# Patient Record
Sex: MALE | Race: WHITE | NOT HISPANIC OR LATINO | Employment: OTHER | URBAN - METROPOLITAN AREA
[De-identification: names, ages, dates, MRNs, and addresses within clinical notes are randomized per-mention and may not be internally consistent; named-entity substitution may affect disease eponyms.]

---

## 2017-02-06 ENCOUNTER — HOSPITAL ENCOUNTER (OUTPATIENT)
Dept: OTHER | Facility: HOSPITAL | Age: 67
Discharge: HOME | End: 2017-02-06
Attending: INTERNAL MEDICINE | Admitting: INTERNAL MEDICINE

## 2018-02-05 ENCOUNTER — HOSPITAL ENCOUNTER (OUTPATIENT)
Dept: OTHER | Facility: HOSPITAL | Age: 68
Discharge: HOME | End: 2018-02-05
Attending: INTERNAL MEDICINE | Admitting: INTERNAL MEDICINE

## 2019-07-12 PROBLEM — I10 HYPERTENSION: Status: ACTIVE | Noted: 2019-07-12

## 2019-07-12 PROBLEM — I25.119: Status: ACTIVE | Noted: 2019-07-12

## 2019-07-12 PROBLEM — M15.9 OSTEOARTHRITIS OF MULTIPLE JOINTS: Status: ACTIVE | Noted: 2019-07-12

## 2019-07-12 PROBLEM — F41.9 ANXIETY: Status: ACTIVE | Noted: 2019-07-12

## 2019-07-12 PROBLEM — E11.9 CONTROLLED TYPE 2 DIABETES MELLITUS WITHOUT COMPLICATION, WITHOUT LONG-TERM CURRENT USE OF INSULIN: Status: ACTIVE | Noted: 2019-07-12

## 2019-07-12 PROBLEM — I73.9 PERIPHERAL VASCULAR DISEASE (HCC): Status: ACTIVE | Noted: 2019-07-12

## 2019-07-12 PROBLEM — F32.A DEPRESSIVE DISORDER: Status: ACTIVE | Noted: 2019-07-12

## 2019-07-12 PROBLEM — K21.9 GASTROESOPHAGEAL REFLUX DISEASE: Status: ACTIVE | Noted: 2019-07-12

## 2019-07-12 PROBLEM — H91.90 HEARING LOSS: Status: ACTIVE | Noted: 2019-07-12

## 2019-07-12 PROBLEM — J45.909 ASTHMA: Status: ACTIVE | Noted: 2019-07-12

## 2019-07-12 PROBLEM — E78.2 MIXED HYPERLIPIDEMIA: Status: ACTIVE | Noted: 2019-07-12

## 2019-07-12 RX ORDER — SUCRALFATE 1 G/1
1 TABLET ORAL 4 TIMES DAILY
COMMUNITY

## 2019-07-12 RX ORDER — LISINOPRIL 2.5 MG/1
2.5 TABLET ORAL DAILY
COMMUNITY
End: 2020-02-06 | Stop reason: ALTCHOICE

## 2019-07-12 RX ORDER — GLIPIZIDE 10 MG/1
10 TABLET ORAL
COMMUNITY
End: 2022-01-20

## 2019-07-12 RX ORDER — NITROGLYCERIN 0.4 MG/1
0.4 TABLET SUBLINGUAL
COMMUNITY

## 2019-07-12 RX ORDER — TADALAFIL 10 MG/1
10 TABLET ORAL DAILY PRN
COMMUNITY

## 2019-07-12 RX ORDER — CLOPIDOGREL BISULFATE 75 MG/1
75 TABLET ORAL DAILY
COMMUNITY
End: 2019-07-12

## 2019-07-12 RX ORDER — PANTOPRAZOLE SODIUM 40 MG/1
40 TABLET, DELAYED RELEASE ORAL DAILY
COMMUNITY

## 2019-07-12 RX ORDER — ASPIRIN 325 MG
325 TABLET ORAL DAILY
COMMUNITY

## 2019-07-12 RX ORDER — MONTELUKAST SODIUM 10 MG/1
10 TABLET ORAL NIGHTLY
COMMUNITY
End: 2020-07-06

## 2019-07-12 RX ORDER — CLOPIDOGREL BISULFATE 75 MG/1
75 TABLET ORAL DAILY
COMMUNITY

## 2019-07-12 RX ORDER — LOVASTATIN 20 MG/1
20 TABLET ORAL NIGHTLY
COMMUNITY

## 2019-07-12 RX ORDER — AMLODIPINE BESYLATE 2.5 MG/1
2.5 TABLET ORAL DAILY
COMMUNITY
End: 2022-01-20

## 2019-07-15 ENCOUNTER — OFFICE VISIT (OUTPATIENT)
Dept: FAMILY MEDICINE CLINIC | Facility: CLINIC | Age: 69
End: 2019-07-15

## 2019-07-15 VITALS
HEART RATE: 78 BPM | HEIGHT: 67 IN | RESPIRATION RATE: 18 BRPM | WEIGHT: 177 LBS | SYSTOLIC BLOOD PRESSURE: 128 MMHG | OXYGEN SATURATION: 98 % | BODY MASS INDEX: 27.78 KG/M2 | DIASTOLIC BLOOD PRESSURE: 88 MMHG | TEMPERATURE: 97.9 F

## 2019-07-15 DIAGNOSIS — I10 HYPERTENSION, UNSPECIFIED TYPE: ICD-10-CM

## 2019-07-15 DIAGNOSIS — I73.9 PERIPHERAL VASCULAR DISEASE (HCC): ICD-10-CM

## 2019-07-15 DIAGNOSIS — J45.909 ASTHMA, UNSPECIFIED ASTHMA SEVERITY, UNSPECIFIED WHETHER COMPLICATED, UNSPECIFIED WHETHER PERSISTENT: ICD-10-CM

## 2019-07-15 DIAGNOSIS — E78.2 MIXED HYPERLIPIDEMIA: ICD-10-CM

## 2019-07-15 DIAGNOSIS — E11.9 CONTROLLED TYPE 2 DIABETES MELLITUS WITHOUT COMPLICATION, WITHOUT LONG-TERM CURRENT USE OF INSULIN (HCC): ICD-10-CM

## 2019-07-15 DIAGNOSIS — I25.119 ATHEROSCLEROSIS OF CORONARY ARTERY WITH ANGINA PECTORIS, UNSPECIFIED VESSEL OR LESION TYPE, UNSPECIFIED WHETHER NATIVE OR TRANSPLANTED HEART (HCC): ICD-10-CM

## 2019-07-15 DIAGNOSIS — F41.9 ANXIETY: ICD-10-CM

## 2019-07-15 DIAGNOSIS — M15.9 OSTEOARTHRITIS OF MULTIPLE JOINTS, UNSPECIFIED OSTEOARTHRITIS TYPE: ICD-10-CM

## 2019-07-15 DIAGNOSIS — K21.9 GASTROESOPHAGEAL REFLUX DISEASE, ESOPHAGITIS PRESENCE NOT SPECIFIED: ICD-10-CM

## 2019-07-15 DIAGNOSIS — H91.90 HEARING LOSS, UNSPECIFIED HEARING LOSS TYPE, UNSPECIFIED LATERALITY: Primary | ICD-10-CM

## 2019-07-15 DIAGNOSIS — G62.9 NEUROPATHY: ICD-10-CM

## 2019-07-15 DIAGNOSIS — F32.A DEPRESSIVE DISORDER: ICD-10-CM

## 2019-07-15 PROCEDURE — 99214 OFFICE O/P EST MOD 30 MIN: CPT | Performed by: FAMILY MEDICINE

## 2019-07-15 NOTE — PROGRESS NOTES
Subjective   Angelo Child is a 69 y.o. male.      reports shooting pain down legs, numb/tingling and pain in feet have recently got worse. A1C over 9 at [facility].  Pt states agent orange contributes to neuropathy      Lower Extremity Issue   The current episode started more than 1 year ago (3). Associated symptoms include fatigue and numbness. Pertinent negatives include no abdominal pain, chest pain, fever, joint swelling, nausea, neck pain, rash, swollen glands, vomiting or weakness.   Diabetes   He presents for his follow-up ([facility]) diabetic visit. He has type 2 diabetes mellitus. His disease course has been worsening. Associated symptoms include fatigue. Pertinent negatives for diabetes include no chest pain, no polydipsia, no polyuria and no weakness. Diabetic complications include peripheral neuropathy. Risk factors for coronary artery disease include diabetes mellitus, dyslipidemia, male sex and hypertension.        The following portions of the patient's history were reviewed and updated as appropriate: allergies, current medications, past family history, past medical history, past social history, past surgical history and problem list.    Patient Active Problem List   Diagnosis   • Hearing loss   • Anxiety   • Peripheral vascular disease (CMS/[facility])   • Osteoarthritis of multiple joints   • Asthma   • Controlled type 2 diabetes mellitus without complication, without long-term current use of insulin (CMS/[facility])   • Hypertension   • Depressive disorder   • Atherosclerosis of coronary artery with angina pectoris (CMS/[facility])   • Mixed hyperlipidemia   • Gastroesophageal reflux disease   • Neuropathy       Current Outpatient Medications on File Prior to Visit   Medication Sig Dispense Refill   • amLODIPine (NORVASC) 2.5 MG tablet Take 2.5 mg by mouth Daily.     • aspirin 325 MG tablet Take 325 mg by mouth Daily.     • clopidogrel (PLAVIX) 75 MG tablet Take 75 mg by mouth Daily.     • glipiZIDE  (GLUCOTROL) 10 MG tablet Take 10 mg by mouth 2 (Two) Times a Day Before Meals.     • lisinopril (PRINIVIL,ZESTRIL) 2.5 MG tablet Take 2.5 mg by mouth Daily.     • lovastatin (MEVACOR) 20 MG tablet Take 20 mg by mouth Every Night.     • metFORMIN (GLUCOPHAGE) 500 MG tablet Take 500 mg by mouth 2 (Two) Times a Day With Meals.     • montelukast (SINGULAIR) 10 MG tablet Take 10 mg by mouth Every Night.     • pantoprazole (PROTONIX) 40 MG EC tablet Take 40 mg by mouth Daily.     • sucralfate (CARAFATE) 1 g tablet Take 1 g by mouth 4 (Four) Times a Day.     • tadalafil (CIALIS) 10 MG tablet Take 10 mg by mouth Daily As Needed.     • nitroglycerin (NITROSTAT) 0.4 MG SL tablet Place 0.4 mg under the tongue Every 5 (Five) Minutes As Needed. Take no more than 3 doses in 15 minutes.       No current facility-administered medications on file prior to visit.        Allergies   Allergen Reactions   • Flexeril [Cyclobenzaprine] Rash   • Lexapro [Escitalopram Oxalate] Rash       Review of Systems   Constitutional: Positive for fatigue. Negative for activity change, appetite change and fever.   HENT: Negative for ear pain and voice change.    Eyes: Negative for visual disturbance.   Respiratory: Negative for shortness of breath and wheezing.    Cardiovascular: Negative for chest pain and leg swelling.   Gastrointestinal: Negative for abdominal pain, blood in stool, constipation, diarrhea, nausea and vomiting.   Endocrine: Negative for polydipsia and polyuria.   Genitourinary: Negative for dysuria, frequency and hematuria.   Musculoskeletal: Negative for joint swelling, neck pain and neck stiffness.   Skin: Negative for rash and bruise.   Neurological: Positive for numbness. Negative for weakness and headache.   Psychiatric/Behavioral: Negative for suicidal ideas and depressed mood.       Objective   Physical Exam   Constitutional: He is oriented to person, place, and time. He appears well-developed and well-nourished.   Eyes:  Conjunctivae and EOM are normal. Pupils are equal, round, and reactive to light.   Neck: Normal range of motion. Neck supple.   Cardiovascular: Normal rate, regular rhythm and normal heart sounds.   Pulmonary/Chest: Effort normal and breath sounds normal.   Abdominal: Soft. Bowel sounds are normal.   Musculoskeletal: Normal range of motion.   Neurological: He is alert and oriented to person, place, and time. A sensory deficit is present.   Skin: Skin is warm and dry.   Psychiatric: He has a normal mood and affect. His behavior is normal. Judgment and thought content normal.         Assessment/Plan .  Problem List Items Addressed This Visit     Hearing loss - Primary    Anxiety    Peripheral vascular disease (CMS/[facility])    Relevant Medications    clopidogrel (PLAVIX) 75 MG tablet    nitroglycerin (NITROSTAT) 0.4 MG SL tablet    Osteoarthritis of multiple joints    Asthma    Relevant Medications    montelukast (SINGULAIR) 10 MG tablet    Controlled type 2 diabetes mellitus without complication, without long-term current use of insulin (CMS/[facility])    Current Assessment & Plan     Diabetes is worsening.   Continue current treatment regimen.  Diabetes will be reassessed in 3 months     [facility] Consider trulicity. [facility] with [facility].         Relevant Medications    glipiZIDE (GLUCOTROL) 10 MG tablet    metFORMIN (GLUCOPHAGE) 500 MG tablet    Other Relevant Orders    TSH    Hypertension    Relevant Medications    lisinopril (PRINIVIL,ZESTRIL) 2.5 MG tablet    amLODIPine (NORVASC) 2.5 MG tablet    Other Relevant Orders    CBC Auto Differential    Depressive disorder    Atherosclerosis of coronary artery with angina pectoris (CMS/[facility])    Relevant Medications    tadalafil (CIALIS) 10 MG tablet    clopidogrel (PLAVIX) 75 MG tablet    aspirin 325 MG tablet    amLODIPine (NORVASC) 2.5 MG tablet    nitroglycerin (NITROSTAT) 0.4 MG SL tablet    Mixed hyperlipidemia    Relevant Medications    lovastatin (MEVACOR) 20  MG tablet    Other Relevant Orders    Comprehensive Metabolic Panel    Lipid Panel With / Chol / HDL Ratio    TSH    Gastroesophageal reflux disease    Relevant Medications    sucralfate (CARAFATE) 1 g tablet    pantoprazole (PROTONIX) 40 MG EC tablet    Neuropathy    Current Assessment & Plan     Likely multifactorial         Relevant Orders    TSH    Sedimentation Rate    Vitamin B12    Folate    Vitamin B1, Whole Blood        Findings discussed. All questions answered.  Follow-up after testing complete, sooner for worsening symptoms or any concerns

## 2019-07-15 NOTE — ASSESSMENT & PLAN NOTE
Diabetes is worsening.   Continue current treatment regimen.  Diabetes will be reassessed in 3 months     [facility] trulicity. [facility].

## 2019-07-17 LAB
ALBUMIN SERPL-MCNC: 4.5 G/DL (ref 3.6–4.8)
ALBUMIN/GLOB SERPL: 2 {RATIO} (ref 1.2–2.2)
ALP SERPL-CCNC: 64 IU/L (ref 39–117)
ALT SERPL-CCNC: 27 IU/L (ref 0–44)
AST SERPL-CCNC: 24 IU/L (ref 0–40)
BASOPHILS # BLD AUTO: 0 X10E3/UL (ref 0–0.2)
BASOPHILS NFR BLD AUTO: 1 %
BILIRUB SERPL-MCNC: 0.3 MG/DL (ref 0–1.2)
BUN SERPL-MCNC: 14 MG/DL (ref 8–27)
BUN/CREAT SERPL: 16 (ref 10–24)
CALCIUM SERPL-MCNC: 9.5 MG/DL (ref 8.6–10.2)
CHLORIDE SERPL-SCNC: 96 MMOL/L (ref 96–106)
CHOLEST SERPL-MCNC: 188 MG/DL (ref 100–199)
CHOLEST/HDLC SERPL: 4.6 RATIO (ref 0–5)
CO2 SERPL-SCNC: 22 MMOL/L (ref 20–29)
CREAT SERPL-MCNC: 0.89 MG/DL (ref 0.76–1.27)
EOSINOPHIL # BLD AUTO: 0.2 X10E3/UL (ref 0–0.4)
EOSINOPHIL NFR BLD AUTO: 4 %
ERYTHROCYTE [DISTWIDTH] IN BLOOD BY AUTOMATED COUNT: 12.9 % (ref 12.3–15.4)
ERYTHROCYTE [SEDIMENTATION RATE] IN BLOOD BY WESTERGREN METHOD: 2 MM/HR (ref 0–30)
FOLATE SERPL-MCNC: 13.5 NG/ML
GLOBULIN SER CALC-MCNC: 2.3 G/DL (ref 1.5–4.5)
GLUCOSE SERPL-MCNC: 222 MG/DL (ref 65–99)
HCT VFR BLD AUTO: 42.8 % (ref 37.5–51)
HDLC SERPL-MCNC: 41 MG/DL
HGB BLD-MCNC: 14.3 G/DL (ref 13–17.7)
IMM GRANULOCYTES # BLD AUTO: 0 X10E3/UL (ref 0–0.1)
IMM GRANULOCYTES NFR BLD AUTO: 0 %
LDLC SERPL CALC-MCNC: 104 MG/DL (ref 0–99)
LYMPHOCYTES # BLD AUTO: 2 X10E3/UL (ref 0.7–3.1)
LYMPHOCYTES NFR BLD AUTO: 32 %
MCH RBC QN AUTO: 29.1 PG (ref 26.6–33)
MCHC RBC AUTO-ENTMCNC: 33.4 G/DL (ref 31.5–35.7)
MCV RBC AUTO: 87 FL (ref 79–97)
MONOCYTES # BLD AUTO: 0.4 X10E3/UL (ref 0.1–0.9)
MONOCYTES NFR BLD AUTO: 7 %
NEUTROPHILS # BLD AUTO: 3.6 X10E3/UL (ref 1.4–7)
NEUTROPHILS NFR BLD AUTO: 56 %
PLATELET # BLD AUTO: 256 X10E3/UL (ref 150–450)
POTASSIUM SERPL-SCNC: 5 MMOL/L (ref 3.5–5.2)
PROT SERPL-MCNC: 6.8 G/DL (ref 6–8.5)
RBC # BLD AUTO: 4.91 X10E6/UL (ref 4.14–5.8)
SODIUM SERPL-SCNC: 135 MMOL/L (ref 134–144)
TRIGL SERPL-MCNC: 215 MG/DL (ref 0–149)
TSH SERPL DL<=0.005 MIU/L-ACNC: 1.56 UIU/ML (ref 0.45–4.5)
VIT B1 BLD-SCNC: 88.5 NMOL/L (ref 66.5–200)
VIT B12 SERPL-MCNC: 598 PG/ML (ref 232–1245)
VLDLC SERPL CALC-MCNC: 43 MG/DL (ref 5–40)
WBC # BLD AUTO: 6.3 X10E3/UL (ref 3.4–10.8)

## 2019-07-18 ENCOUNTER — TELEPHONE (OUTPATIENT)
Dept: FAMILY MEDICINE CLINIC | Facility: CLINIC | Age: 69
End: 2019-07-18

## 2019-07-18 NOTE — TELEPHONE ENCOUNTER
----- Message from Clayton Chino MD sent at 7/18/2019  9:23 AM EDT -----  Please notify patient that labs are stable/looked ok.

## 2019-10-21 ENCOUNTER — OFFICE VISIT (OUTPATIENT)
Dept: FAMILY MEDICINE CLINIC | Facility: CLINIC | Age: 69
End: 2019-10-21

## 2019-10-21 VITALS
DIASTOLIC BLOOD PRESSURE: 70 MMHG | OXYGEN SATURATION: 95 % | BODY MASS INDEX: 28.12 KG/M2 | TEMPERATURE: 98.1 F | WEIGHT: 179.2 LBS | HEIGHT: 67 IN | RESPIRATION RATE: 18 BRPM | SYSTOLIC BLOOD PRESSURE: 136 MMHG | HEART RATE: 81 BPM

## 2019-10-21 DIAGNOSIS — M79.671 PAIN OF RIGHT HEEL: Primary | ICD-10-CM

## 2019-10-21 DIAGNOSIS — N48.1 BALANITIS: ICD-10-CM

## 2019-10-21 DIAGNOSIS — Z78.9 UNCIRCUMCISED MALE: ICD-10-CM

## 2019-10-21 DIAGNOSIS — L72.0 EIC (EPIDERMAL INCLUSION CYST): ICD-10-CM

## 2019-10-21 PROCEDURE — 99214 OFFICE O/P EST MOD 30 MIN: CPT | Performed by: FAMILY MEDICINE

## 2019-10-21 RX ORDER — NYSTATIN 100000 U/G
CREAM TOPICAL 2 TIMES DAILY
Qty: 60 G | Refills: 0 | Status: SHIPPED | OUTPATIENT
Start: 2019-10-21

## 2019-10-21 RX ORDER — LISINOPRIL 5 MG/1
TABLET ORAL
COMMUNITY
Start: 2019-09-09 | End: 2019-12-05 | Stop reason: SDUPTHER

## 2019-10-21 RX ORDER — LANCING DEVICE/LANCETS
KIT MISCELLANEOUS
COMMUNITY
Start: 2019-09-09

## 2019-10-21 RX ORDER — IBUPROFEN 800 MG/1
800 TABLET ORAL EVERY 8 HOURS PRN
Qty: 90 TABLET | Refills: 3 | Status: SHIPPED | OUTPATIENT
Start: 2019-10-21 | End: 2021-03-11

## 2019-10-21 RX ORDER — LOVASTATIN 40 MG/1
TABLET ORAL
COMMUNITY
Start: 2019-09-09 | End: 2019-12-05 | Stop reason: SDUPTHER

## 2019-10-21 RX ORDER — CEPHALEXIN 500 MG/1
500 CAPSULE ORAL 3 TIMES DAILY
Qty: 30 CAPSULE | Refills: 0 | Status: SHIPPED | OUTPATIENT
Start: 2019-10-21 | End: 2019-10-31

## 2019-10-21 RX ORDER — BLOOD SUGAR DIAGNOSTIC
STRIP MISCELLANEOUS
COMMUNITY
Start: 2019-09-09

## 2019-10-21 NOTE — PROGRESS NOTES
Subjective   Angelo Child is a 69 y.o. male.     Foot Injury    The incident occurred more than 1 week ago. The incident occurred at home. The pain is present in the right heel. The pain has been constant since onset. Pertinent negatives include no numbness.        The following portions of the patient's history were reviewed and updated as appropriate: allergies, current medications, past family history, past medical history, past social history, past surgical history and problem list.    History reviewed. No pertinent family history.    Social History     Tobacco Use   • Smoking status: Former Smoker   • Smokeless tobacco: Never Used   Substance Use Topics   • Alcohol use: No     Frequency: Never   • Drug use: No       No past surgical history on file.    Patient Active Problem List   Diagnosis   • Hearing loss   • Anxiety   • Peripheral vascular disease (CMS/[facility])   • Osteoarthritis of multiple joints   • Asthma   • Controlled type 2 diabetes mellitus without complication, without long-term current use of insulin (CMS/[facility])   • Hypertension   • Depressive disorder   • Atherosclerosis of coronary artery with angina pectoris (CMS/[facility])   • Mixed hyperlipidemia   • Gastroesophageal reflux disease   • Neuropathy       Current Outpatient Medications on File Prior to Visit   Medication Sig Dispense Refill   • amLODIPine (NORVASC) 2.5 MG tablet Take 2.5 mg by mouth Daily.     • aspirin 325 MG tablet Take 325 mg by mouth Daily.     • clopidogrel (PLAVIX) 75 MG tablet Take 75 mg by mouth Daily.     • glipiZIDE (GLUCOTROL) 10 MG tablet Take 10 mg by mouth 2 (Two) Times a Day Before Meals.     • lisinopril (PRINIVIL,ZESTRIL) 2.5 MG tablet Take 2.5 mg by mouth Daily.     • lovastatin (MEVACOR) 20 MG tablet Take 20 mg by mouth Every Night.     • metFORMIN (GLUCOPHAGE) 500 MG tablet Take 500 mg by mouth 2 (Two) Times a Day With Meals.     • montelukast (SINGULAIR) 10 MG tablet Take 10 mg by mouth Every Night.     • pantoprazole  "(PROTONIX) 40 MG EC tablet Take 40 mg by mouth Daily.     • sucralfate (CARAFATE) 1 g tablet Take 1 g by mouth 4 (Four) Times a Day.     • tadalafil (CIALIS) 10 MG tablet Take 10 mg by mouth Daily As Needed.     • nitroglycerin (NITROSTAT) 0.4 MG SL tablet Place 0.4 mg under the tongue Every 5 (Five) Minutes As Needed. Take no more than 3 doses in 15 minutes.       No current facility-administered medications on file prior to visit.        Allergies   Allergen Reactions   • Flexeril [Cyclobenzaprine] Rash   • Lexapro [Escitalopram Oxalate] Rash       Review of Systems   Constitutional: Negative for activity change, appetite change, fatigue and fever.   HENT: Negative for ear pain, swollen glands and voice change.    Eyes: Negative for visual disturbance.   Respiratory: Negative for shortness of breath and wheezing.    Cardiovascular: Negative for chest pain and leg swelling.   Gastrointestinal: Negative for abdominal pain, blood in stool, constipation, diarrhea, nausea and vomiting.   Endocrine: Negative for polydipsia and polyuria.   Genitourinary: Negative for dysuria, frequency and hematuria.   Musculoskeletal: Negative for joint swelling, neck pain and neck stiffness.   Skin: Negative for rash and bruise.   Neurological: Negative for weakness, numbness and headache.   Psychiatric/Behavioral: Negative for suicidal ideas and depressed mood.       Objective   Visit Vitals  /70 (BP Location: Right arm, Patient Position: Sitting, Cuff Size: Adult)   Pulse 81   Temp 98.1 °F (36.7 °C) (Oral)   Resp 18   Ht 170.2 cm (67\")   Wt 81.3 kg (179 lb 3.2 oz)   SpO2 95%   BMI 28.07 kg/m²     Physical Exam   Constitutional: He is oriented to person, place, and time. He appears well-developed and well-nourished.   Eyes: Conjunctivae and EOM are normal. Pupils are equal, round, and reactive to light.   Neck: Normal range of motion. Neck supple.   Cardiovascular: Normal rate, regular rhythm and normal heart sounds. "   Pulmonary/Chest: Effort normal and breath sounds normal.   Abdominal: Soft. Bowel sounds are normal.   Musculoskeletal: Normal range of motion.   Neurological: He is alert and oriented to person, place, and time.   Skin: Skin is warm and dry.   Some erythema about uncircumcised foreskin. Approx 1 cm cystic nodule with pit consistent with EIC on ventral shaft of penis, some erythema and tenderness.   Psychiatric: He has a normal mood and affect. His behavior is normal. Judgment and thought content normal.         Assessment/Plan .  Problem List Items Addressed This Visit     None      Visit Diagnoses     Pain of right heel    -  Primary    Relevant Medications    ibuprofen (ADVIL,MOTRIN) 800 MG tablet    EIC (epidermal inclusion cyst)        likely on shaft of penis. Infected.     Relevant Medications    cephalexin (KEFLEX) 500 MG capsule    Other Relevant Orders    Ambulatory Referral to Urology    Balanitis        Relevant Medications    nystatin (MYCOSTATIN) 363151 UNIT/GM cream    Other Relevant Orders    Ambulatory Referral to Urology    Uncircumcised male        pt states he is having a little difficulty with retracting foreskin, would like to discuss circ         Findings discussed. All questions answered.  Medication and medication adverse effects discussed.  Drug education given and explained to patient. Patient verbalized understanding.  Follow-up in 2 weeks if not better.  Follow-up sooner for worsening symptoms or for any concerns.   Follow-up for routine health maintenance as directed

## 2019-12-05 ENCOUNTER — OFFICE VISIT (OUTPATIENT)
Dept: FAMILY MEDICINE CLINIC | Facility: CLINIC | Age: 69
End: 2019-12-05

## 2019-12-05 VITALS
DIASTOLIC BLOOD PRESSURE: 78 MMHG | TEMPERATURE: 98.5 F | HEART RATE: 79 BPM | SYSTOLIC BLOOD PRESSURE: 125 MMHG | WEIGHT: 178.6 LBS | BODY MASS INDEX: 28.03 KG/M2 | OXYGEN SATURATION: 99 % | HEIGHT: 67 IN

## 2019-12-05 DIAGNOSIS — E11.9 CONTROLLED TYPE 2 DIABETES MELLITUS WITHOUT COMPLICATION, WITHOUT LONG-TERM CURRENT USE OF INSULIN (HCC): ICD-10-CM

## 2019-12-05 DIAGNOSIS — E78.2 MIXED HYPERLIPIDEMIA: Primary | ICD-10-CM

## 2019-12-05 DIAGNOSIS — J06.9 UPPER RESPIRATORY TRACT INFECTION, UNSPECIFIED TYPE: ICD-10-CM

## 2019-12-05 DIAGNOSIS — I10 ESSENTIAL HYPERTENSION: ICD-10-CM

## 2019-12-05 DIAGNOSIS — M79.671 FOOT PAIN, RIGHT: ICD-10-CM

## 2019-12-05 PROCEDURE — 99214 OFFICE O/P EST MOD 30 MIN: CPT | Performed by: FAMILY MEDICINE

## 2019-12-05 RX ORDER — TRAMADOL HYDROCHLORIDE 50 MG/1
50 TABLET ORAL EVERY 8 HOURS PRN
Refills: 0 | COMMUNITY
Start: 2019-11-19 | End: 2020-12-08 | Stop reason: SDUPTHER

## 2019-12-05 RX ORDER — TAMSULOSIN HYDROCHLORIDE 0.4 MG/1
CAPSULE ORAL
COMMUNITY
Start: 2019-12-04 | End: 2019-12-05 | Stop reason: SDUPTHER

## 2019-12-05 RX ORDER — CEPHALEXIN 500 MG/1
CAPSULE ORAL
COMMUNITY
Start: 2019-11-19 | End: 2019-12-05 | Stop reason: SDUPTHER

## 2019-12-05 RX ORDER — INDOMETHACIN 25 MG/1
25 CAPSULE ORAL 3 TIMES DAILY
Qty: 30 CAPSULE | Refills: 0 | Status: SHIPPED | OUTPATIENT
Start: 2019-12-05 | End: 2020-02-06

## 2019-12-05 RX ORDER — CEPHALEXIN 500 MG/1
500 CAPSULE ORAL 3 TIMES DAILY
Qty: 30 CAPSULE | Refills: 0 | Status: SHIPPED | OUTPATIENT
Start: 2019-12-05 | End: 2019-12-15

## 2019-12-05 RX ORDER — AMLODIPINE BESYLATE 5 MG/1
TABLET ORAL
COMMUNITY
Start: 2019-11-12 | End: 2019-12-05 | Stop reason: SDUPTHER

## 2019-12-05 RX ORDER — LANCETS
EACH MISCELLANEOUS
COMMUNITY
Start: 2019-11-03

## 2019-12-05 NOTE — PROGRESS NOTES
Chief Complaint   Patient presents with   • Foot Burn       Subjective   Angelo Child is a 69 y.o. male.     Here to follow up on medical problems to include DM, chol, htn.       Foot Burn   This is a chronic problem. The current episode started 1 to 4 weeks ago. The problem occurs daily. The problem has been unchanged. Associated symptoms include numbness and weakness. Pertinent negatives include no abdominal pain, chest pain, fatigue, fever, joint swelling, nausea, neck pain, rash, swollen glands or vomiting. The symptoms are aggravated by standing and walking. The treatment provided mild relief.        I have reviewed and updated his medications, medical history and problem list during today's office visit.       Past Medical History :  Active Ambulatory Problems     Diagnosis Date Noted   • Hearing loss 07/12/2019   • Anxiety 07/12/2019   • Peripheral vascular disease (CMS/HCC) 07/12/2019   • Osteoarthritis of multiple joints 07/12/2019   • Asthma 07/12/2019   • Controlled type 2 diabetes mellitus without complication, without long-term current use of insulin (CMS/HCC) 07/12/2019   • Hypertension 07/12/2019   • Depressive disorder 07/12/2019   • Atherosclerosis of coronary artery with angina pectoris (CMS/HCC) 07/12/2019   • Mixed hyperlipidemia 07/12/2019   • Gastroesophageal reflux disease 07/12/2019   • Neuropathy 07/15/2019     Resolved Ambulatory Problems     Diagnosis Date Noted   • No Resolved Ambulatory Problems     No Additional Past Medical History       Medication List:    Current Outpatient Medications:   •  ACCU-CHEK FASTCLIX LANCETS misc, , Disp: , Rfl:   •  ACCU-CHEK SMARTVIEW test strip, , Disp: , Rfl:   •  amLODIPine (NORVASC) 2.5 MG tablet, Take 2.5 mg by mouth Daily., Disp: , Rfl:   •  aspirin 325 MG tablet, Take 325 mg by mouth Daily., Disp: , Rfl:   •  cephalexin (KEFLEX) 500 MG capsule, Take 1 capsule by mouth 3 (Three) Times a Day for 10 days., Disp: 30 capsule, Rfl: 0  •  clopidogrel  (PLAVIX) 75 MG tablet, Take 75 mg by mouth Daily., Disp: , Rfl:   •  glipiZIDE (GLUCOTROL) 10 MG tablet, Take 10 mg by mouth 2 (Two) Times a Day Before Meals., Disp: , Rfl:   •  ibuprofen (ADVIL,MOTRIN) 800 MG tablet, Take 1 tablet by mouth Every 8 (Eight) Hours As Needed for Mild Pain ., Disp: 90 tablet, Rfl: 3  •  indomethacin (INDOCIN) 25 MG capsule, Take 1 capsule by mouth 3 (Three) Times a Day., Disp: 30 capsule, Rfl: 0  •  Lancets Misc. (ACCU-CHEK FASTCLIX LANCET) kit, , Disp: , Rfl:   •  lisinopril (PRINIVIL,ZESTRIL) 2.5 MG tablet, Take 2.5 mg by mouth Daily., Disp: , Rfl:   •  lovastatin (MEVACOR) 20 MG tablet, Take 20 mg by mouth Every Night., Disp: , Rfl:   •  metFORMIN (GLUCOPHAGE) 500 MG tablet, Take 500 mg by mouth 2 (Two) Times a Day With Meals., Disp: , Rfl:   •  montelukast (SINGULAIR) 10 MG tablet, Take 10 mg by mouth Every Night., Disp: , Rfl:   •  nitroglycerin (NITROSTAT) 0.4 MG SL tablet, Place 0.4 mg under the tongue Every 5 (Five) Minutes As Needed. Take no more than 3 doses in 15 minutes., Disp: , Rfl:   •  nystatin (MYCOSTATIN) 676220 UNIT/GM cream, Apply  topically to the appropriate area as directed 2 (Two) Times a Day., Disp: 60 g, Rfl: 0  •  pantoprazole (PROTONIX) 40 MG EC tablet, Take 40 mg by mouth Daily., Disp: , Rfl:   •  sucralfate (CARAFATE) 1 g tablet, Take 1 g by mouth 4 (Four) Times a Day., Disp: , Rfl:   •  tadalafil (CIALIS) 10 MG tablet, Take 10 mg by mouth Daily As Needed., Disp: , Rfl:   •  traMADol (ULTRAM) 50 MG tablet, Take 50 mg by mouth Every 8 (Eight) Hours As Needed., Disp: , Rfl: 0    Social History     Tobacco Use   • Smoking status: Former Smoker   • Smokeless tobacco: Never Used   Substance Use Topics   • Alcohol use: No     Frequency: Never       Review of Systems   Constitutional: Negative for activity change, appetite change, fatigue and fever.   HENT: Negative for ear pain, swollen glands and voice change.    Eyes: Negative for visual disturbance.  "  Respiratory: Negative for shortness of breath and wheezing.    Cardiovascular: Negative for chest pain and leg swelling.   Gastrointestinal: Negative for abdominal pain, blood in stool, constipation, diarrhea, nausea and vomiting.   Endocrine: Negative for polydipsia and polyuria.   Genitourinary: Negative for dysuria, frequency and hematuria.   Musculoskeletal: Negative for joint swelling, neck pain and neck stiffness.   Skin: Negative for rash and bruise.   Neurological: Positive for weakness and numbness. Negative for headache.   Psychiatric/Behavioral: Negative for suicidal ideas and depressed mood.       Objective   Vitals:    12/05/19 1040   BP: 125/78   Pulse: 79   Temp: 98.5 °F (36.9 °C)   TempSrc: Oral   SpO2: 99%   Weight: 81 kg (178 lb 9.6 oz)   Height: 170.2 cm (67.01\")     Body mass index is 27.97 kg/m².  Physical Exam   Constitutional: He is oriented to person, place, and time. He appears well-developed and well-nourished.   Eyes: Conjunctivae and EOM are normal. Pupils are equal, round, and reactive to light.   Neck: Normal range of motion. Neck supple.   Cardiovascular: Normal rate, regular rhythm and normal heart sounds.   Pulmonary/Chest: Effort normal and breath sounds normal.   Abdominal: Soft. Bowel sounds are normal.   Musculoskeletal: Normal range of motion.   Tenderness with palpation right foot origin of plantar fascia   Neurological: He is alert and oriented to person, place, and time.   Skin: Skin is warm and dry.   Psychiatric: He has a normal mood and affect. His behavior is normal. Judgment and thought content normal.                 Assessment/Plan     Diagnoses and all orders for this visit:    1. Mixed hyperlipidemia (Primary)  -     Lipid Panel With / Chol / HDL Ratio  -     TSH    2. Essential hypertension  -     CBC Auto Differential  -     Comprehensive Metabolic Panel    3. Controlled type 2 diabetes mellitus without complication, without long-term current use of insulin " (CMS/[facility])  -     Hemoglobin A1c    4. Foot pain, right  -     indomethacin (INDOCIN) 25 MG capsule; Take 1 capsule by mouth 3 (Three) Times a Day.  Dispense: 30 capsule; Refill: 0    5. Upper respiratory tract infection, unspecified type  -     cephalexin (KEFLEX) 500 MG capsule; Take 1 capsule by mouth 3 (Three) Times a Day for 10 days.  Dispense: 30 capsule; Refill: 0    suspect plantar fascitis right foot. Findings discussed. All questions answered. Follow up in 3 months for recheck, sooner for concerns.     Return in about 3 months (around 3/5/2020).

## 2019-12-06 ENCOUNTER — TELEPHONE (OUTPATIENT)
Dept: FAMILY MEDICINE CLINIC | Facility: CLINIC | Age: 69
End: 2019-12-06

## 2019-12-06 LAB
ALBUMIN SERPL-MCNC: 4.7 G/DL (ref 3.6–4.8)
ALBUMIN/GLOB SERPL: 2.4 {RATIO} (ref 1.2–2.2)
ALP SERPL-CCNC: 67 IU/L (ref 39–117)
ALT SERPL-CCNC: 35 IU/L (ref 0–44)
AST SERPL-CCNC: 27 IU/L (ref 0–40)
BASOPHILS # BLD AUTO: 0 X10E3/UL (ref 0–0.2)
BASOPHILS NFR BLD AUTO: 1 %
BILIRUB SERPL-MCNC: 0.5 MG/DL (ref 0–1.2)
BUN SERPL-MCNC: 16 MG/DL (ref 8–27)
BUN/CREAT SERPL: 18 (ref 10–24)
CALCIUM SERPL-MCNC: 9.6 MG/DL (ref 8.6–10.2)
CHLORIDE SERPL-SCNC: 99 MMOL/L (ref 96–106)
CHOLEST SERPL-MCNC: 189 MG/DL (ref 100–199)
CHOLEST/HDLC SERPL: 3.9 RATIO (ref 0–5)
CO2 SERPL-SCNC: 20 MMOL/L (ref 20–29)
CREAT SERPL-MCNC: 0.9 MG/DL (ref 0.76–1.27)
EOSINOPHIL # BLD AUTO: 0.2 X10E3/UL (ref 0–0.4)
EOSINOPHIL NFR BLD AUTO: 4 %
ERYTHROCYTE [DISTWIDTH] IN BLOOD BY AUTOMATED COUNT: 13.4 % (ref 12.3–15.4)
GLOBULIN SER CALC-MCNC: 2 G/DL (ref 1.5–4.5)
GLUCOSE SERPL-MCNC: 255 MG/DL (ref 65–99)
HBA1C MFR BLD: 10 % (ref 4.8–5.6)
HCT VFR BLD AUTO: 44.2 % (ref 37.5–51)
HDLC SERPL-MCNC: 49 MG/DL
HGB BLD-MCNC: 14.2 G/DL (ref 13–17.7)
IMM GRANULOCYTES # BLD AUTO: 0 X10E3/UL (ref 0–0.1)
IMM GRANULOCYTES NFR BLD AUTO: 0 %
LDLC SERPL CALC-MCNC: 117 MG/DL (ref 0–99)
LYMPHOCYTES # BLD AUTO: 2.1 X10E3/UL (ref 0.7–3.1)
LYMPHOCYTES NFR BLD AUTO: 33 %
MCH RBC QN AUTO: 28.5 PG (ref 26.6–33)
MCHC RBC AUTO-ENTMCNC: 32.1 G/DL (ref 31.5–35.7)
MCV RBC AUTO: 89 FL (ref 79–97)
MONOCYTES # BLD AUTO: 0.5 X10E3/UL (ref 0.1–0.9)
MONOCYTES NFR BLD AUTO: 9 %
NEUTROPHILS # BLD AUTO: 3.4 X10E3/UL (ref 1.4–7)
NEUTROPHILS NFR BLD AUTO: 53 %
PLATELET # BLD AUTO: 274 X10E3/UL (ref 150–450)
POTASSIUM SERPL-SCNC: 5.1 MMOL/L (ref 3.5–5.2)
PROT SERPL-MCNC: 6.7 G/DL (ref 6–8.5)
RBC # BLD AUTO: 4.99 X10E6/UL (ref 4.14–5.8)
SODIUM SERPL-SCNC: 137 MMOL/L (ref 134–144)
TRIGL SERPL-MCNC: 116 MG/DL (ref 0–149)
TSH SERPL DL<=0.005 MIU/L-ACNC: 1.24 UIU/ML (ref 0.45–4.5)
VLDLC SERPL CALC-MCNC: 23 MG/DL (ref 5–40)
WBC # BLD AUTO: 6.3 X10E3/UL (ref 3.4–10.8)

## 2019-12-06 NOTE — TELEPHONE ENCOUNTER
----- Message from Clayton Chino MD sent at 12/6/2019 10:44 AM EST -----  Please notify patient that  Sugar significantly elevated consistent with uncontrolled diabetes. [facility] endocrine eval

## 2020-02-06 ENCOUNTER — HOSPITAL ENCOUNTER (OUTPATIENT)
Dept: GENERAL RADIOLOGY | Facility: HOSPITAL | Age: 70
Discharge: HOME | End: 2020-02-06
Admitting: FAMILY MEDICINE

## 2020-02-06 ENCOUNTER — OFFICE VISIT (OUTPATIENT)
Dept: FAMILY MEDICINE CLINIC | Facility: CLINIC | Age: 70
End: 2020-02-06

## 2020-02-06 ENCOUNTER — TELEPHONE (OUTPATIENT)
Dept: FAMILY MEDICINE CLINIC | Facility: CLINIC | Age: 70
End: 2020-02-06

## 2020-02-06 VITALS
RESPIRATION RATE: 18 BRPM | DIASTOLIC BLOOD PRESSURE: 80 MMHG | HEIGHT: 67 IN | WEIGHT: 178 LBS | TEMPERATURE: 98 F | SYSTOLIC BLOOD PRESSURE: 112 MMHG | HEART RATE: 95 BPM | BODY MASS INDEX: 27.94 KG/M2 | OXYGEN SATURATION: 96 %

## 2020-02-06 DIAGNOSIS — J01.00 ACUTE NON-RECURRENT MAXILLARY SINUSITIS: ICD-10-CM

## 2020-02-06 DIAGNOSIS — M79.671 FOOT PAIN, RIGHT: Primary | ICD-10-CM

## 2020-02-06 PROCEDURE — 73630 X-RAY EXAM OF FOOT: CPT

## 2020-02-06 PROCEDURE — 99214 OFFICE O/P EST MOD 30 MIN: CPT | Performed by: FAMILY MEDICINE

## 2020-02-06 RX ORDER — CEPHALEXIN 500 MG/1
500 CAPSULE ORAL 3 TIMES DAILY
Qty: 30 CAPSULE | Refills: 0 | Status: SHIPPED | OUTPATIENT
Start: 2020-02-06 | End: 2020-02-16

## 2020-02-06 RX ORDER — LISINOPRIL 5 MG/1
1 TABLET ORAL DAILY
COMMUNITY
Start: 2020-01-15

## 2020-02-06 RX ORDER — INDOMETHACIN 25 MG/1
25 CAPSULE ORAL 3 TIMES DAILY
Qty: 60 CAPSULE | Refills: 3 | Status: SHIPPED | OUTPATIENT
Start: 2020-02-06

## 2020-02-06 NOTE — TELEPHONE ENCOUNTER
----- Message from Clayton Chino MD sent at 2/6/2020  2:44 PM EST -----  Please notify patient that  He did not have a heel spur. Continue nsaids and stretching.  Still may need podiatry eval if sx persist

## 2020-02-06 NOTE — PROGRESS NOTES
Subjective   Angelo Child is a 69 y.o. male.     Right heel pain returned after finishing Indocin 25mg last month.     Cough   The current episode started in the past 7 days (Tuesday). The problem occurs every few minutes. The cough is productive of sputum. Associated symptoms include ear congestion, ear pain, headaches, nasal congestion and postnasal drip. Pertinent negatives include no chest pain, fever, rash, shortness of breath or wheezing. Associated symptoms comments: Bilateral neck pain behind ears.        The following portions of the patient's history were reviewed and updated as appropriate: allergies, current medications, past family history, past medical history, past social history, past surgical history and problem list.    Patient Active Problem List   Diagnosis   • Hearing loss   • Anxiety   • Peripheral vascular disease (CMS/[facility])   • Osteoarthritis of multiple joints   • Asthma   • Controlled type 2 diabetes mellitus without complication, without long-term current use of insulin (CMS/[facility])   • Hypertension   • Depressive disorder   • Atherosclerosis of coronary artery with angina pectoris (CMS/[facility])   • Mixed hyperlipidemia   • Gastroesophageal reflux disease   • Neuropathy       Current Outpatient Medications on File Prior to Visit   Medication Sig Dispense Refill   • ACCU-CHEK FASTCLIX LANCETS misc      • ACCU-CHEK SMARTVIEW test strip      • amLODIPine (NORVASC) 2.5 MG tablet Take 2.5 mg by mouth Daily.     • aspirin 325 MG tablet Take 325 mg by mouth Daily.     • clopidogrel (PLAVIX) 75 MG tablet Take 75 mg by mouth Daily.     • glipiZIDE (GLUCOTROL) 10 MG tablet Take 10 mg by mouth 2 (Two) Times a Day Before Meals.     • ibuprofen (ADVIL,MOTRIN) 800 MG tablet Take 1 tablet by mouth Every 8 (Eight) Hours As Needed for Mild Pain . 90 tablet 3   • Lancets Misc. (ACCU-CHEK FASTCLIX LANCET) kit      • lisinopril (PRINIVIL,ZESTRIL) 5 MG tablet Take 1 tablet by mouth Daily.     • lovastatin (MEVACOR)  20 MG tablet Take 20 mg by mouth Every Night.     • metFORMIN (GLUCOPHAGE) 500 MG tablet Take 500 mg by mouth 2 (Two) Times a Day With Meals.     • montelukast (SINGULAIR) 10 MG tablet Take 10 mg by mouth Every Night.     • nitroglycerin (NITROSTAT) 0.4 MG SL tablet Place 0.4 mg under the tongue Every 5 (Five) Minutes As Needed. Take no more than 3 doses in 15 minutes.     • nystatin (MYCOSTATIN) 512432 UNIT/GM cream Apply  topically to the appropriate area as directed 2 (Two) Times a Day. 60 g 0   • pantoprazole (PROTONIX) 40 MG EC tablet Take 40 mg by mouth Daily.     • sucralfate (CARAFATE) 1 g tablet Take 1 g by mouth 4 (Four) Times a Day.     • tadalafil (CIALIS) 10 MG tablet Take 10 mg by mouth Daily As Needed.     • traMADol (ULTRAM) 50 MG tablet Take 50 mg by mouth Every 8 (Eight) Hours As Needed.  0   • [DISCONTINUED] indomethacin (INDOCIN) 25 MG capsule Take 1 capsule by mouth 3 (Three) Times a Day. 30 capsule 0   • [DISCONTINUED] lisinopril (PRINIVIL,ZESTRIL) 2.5 MG tablet Take 2.5 mg by mouth Daily.       No current facility-administered medications on file prior to visit.        Allergies   Allergen Reactions   • Flexeril [Cyclobenzaprine] Rash   • Lexapro [Escitalopram Oxalate] Rash       Review of Systems   Constitutional: Negative for activity change, appetite change, fatigue and fever.   HENT: Positive for ear pain and postnasal drip. Negative for swollen glands and voice change.    Eyes: Negative for visual disturbance.   Respiratory: Positive for cough. Negative for shortness of breath and wheezing.    Cardiovascular: Negative for chest pain and leg swelling.   Gastrointestinal: Negative for abdominal pain, blood in stool, constipation, diarrhea, nausea and vomiting.   Endocrine: Negative for polydipsia and polyuria.   Genitourinary: Negative for dysuria, frequency and hematuria.   Musculoskeletal: Negative for joint swelling, neck pain and neck stiffness.   Skin: Negative for rash and bruise.    Neurological: Negative for weakness, numbness and headache.   Psychiatric/Behavioral: Negative for suicidal ideas and depressed mood.     I have reviewed and confirmed the accuracy of the ROS as documented by the MA/LPN/RN Clayton Chino MD      Objective   Vitals:    02/06/20 1151   BP: 112/80   Pulse: 95   Resp: 18   Temp: 98 °F (36.7 °C)   SpO2: 96%     Physical Exam   Constitutional: He is oriented to person, place, and time. He appears well-developed and well-nourished.   Eyes: Pupils are equal, round, and reactive to light. Conjunctivae and EOM are normal.   Neck: Normal range of motion. Neck supple.   Cardiovascular: Normal rate, regular rhythm and normal heart sounds.   Pulmonary/Chest: Effort normal and breath sounds normal.   Abdominal: Soft. Bowel sounds are normal.   Musculoskeletal: Normal range of motion.   Neurological: He is alert and oriented to person, place, and time.   Skin: Skin is warm and dry.   Psychiatric: He has a normal mood and affect. His behavior is normal. Judgment and thought content normal.         Assessment/Plan .  Problem List Items Addressed This Visit     None      Visit Diagnoses     Foot pain, right    -  Primary    Relevant Medications    indomethacin (INDOCIN) 25 MG capsule    Other Relevant Orders    XR Foot 3+ View Right (Completed)    Acute non-recurrent maxillary sinusitis        Relevant Medications    cephalexin (KEFLEX) 500 MG capsule      Findings discussed. All questions answered.  Medication and medication adverse effects discussed.  Drug education given and explained to patient. Patient verbalized understanding.  Follow-up in 2 weeks if not better.  Follow-up sooner for worsening symptoms or for any concerns.

## 2020-07-01 DIAGNOSIS — J45.909 ASTHMA, UNSPECIFIED ASTHMA SEVERITY, UNSPECIFIED WHETHER COMPLICATED, UNSPECIFIED WHETHER PERSISTENT: ICD-10-CM

## 2020-07-06 RX ORDER — MONTELUKAST SODIUM 10 MG/1
TABLET ORAL
Qty: 90 TABLET | Refills: 0 | Status: SHIPPED | OUTPATIENT
Start: 2020-07-06 | End: 2021-03-04

## 2020-12-08 ENCOUNTER — OFFICE VISIT (OUTPATIENT)
Dept: FAMILY MEDICINE CLINIC | Facility: CLINIC | Age: 70
End: 2020-12-08

## 2020-12-08 VITALS
BODY MASS INDEX: 28.63 KG/M2 | DIASTOLIC BLOOD PRESSURE: 66 MMHG | OXYGEN SATURATION: 99 % | HEIGHT: 67 IN | RESPIRATION RATE: 18 BRPM | HEART RATE: 88 BPM | TEMPERATURE: 96.9 F | SYSTOLIC BLOOD PRESSURE: 118 MMHG | WEIGHT: 182.4 LBS

## 2020-12-08 DIAGNOSIS — R35.0 URINARY FREQUENCY: Primary | ICD-10-CM

## 2020-12-08 DIAGNOSIS — M15.9 OSTEOARTHRITIS OF MULTIPLE JOINTS, UNSPECIFIED OSTEOARTHRITIS TYPE: ICD-10-CM

## 2020-12-08 LAB
BILIRUB BLD-MCNC: NEGATIVE MG/DL
CLARITY, POC: CLEAR
COLOR UR: YELLOW
GLUCOSE UR STRIP-MCNC: ABNORMAL MG/DL
KETONES UR QL: NEGATIVE
LEUKOCYTE EST, POC: NEGATIVE
NITRITE UR-MCNC: NEGATIVE MG/ML
PH UR: 5 [PH] (ref 5–8)
PROT UR STRIP-MCNC: NEGATIVE MG/DL
RBC # UR STRIP: NEGATIVE /UL
SP GR UR: 1.02 (ref 1–1.03)
UROBILINOGEN UR QL: NORMAL

## 2020-12-08 PROCEDURE — 81003 URINALYSIS AUTO W/O SCOPE: CPT | Performed by: FAMILY MEDICINE

## 2020-12-08 PROCEDURE — 99213 OFFICE O/P EST LOW 20 MIN: CPT | Performed by: FAMILY MEDICINE

## 2020-12-08 RX ORDER — VARDENAFIL HYDROCHLORIDE 20 MG/1
TABLET ORAL
COMMUNITY

## 2020-12-08 RX ORDER — TRAMADOL HYDROCHLORIDE 50 MG/1
50 TABLET ORAL EVERY 8 HOURS PRN
Qty: 28 TABLET | Refills: 0 | Status: SHIPPED | OUTPATIENT
Start: 2020-12-08

## 2020-12-08 RX ORDER — SULFAMETHOXAZOLE AND TRIMETHOPRIM 800; 160 MG/1; MG/1
1 TABLET ORAL 2 TIMES DAILY
Qty: 20 TABLET | Refills: 0 | Status: SHIPPED | OUTPATIENT
Start: 2020-12-08 | End: 2022-01-20

## 2020-12-08 RX ORDER — TAMSULOSIN HYDROCHLORIDE 0.4 MG/1
CAPSULE ORAL
COMMUNITY
End: 2022-01-20

## 2020-12-08 NOTE — PROGRESS NOTES
Subjective   Angelo Child is a 70 y.o. male.     Chief Complaint   Patient presents with   • Abdominal Pain       Abdominal Pain  This is a new problem. The current episode started in the past 7 days. The problem occurs constantly. The problem has been unchanged. The pain is located in the RLQ, LLQ and suprapubic region. The quality of the pain is aching. Associated symptoms include frequency. Pertinent negatives include no constipation, diarrhea, dysuria, fever, hematuria, nausea or vomiting. Exacerbated by: caffine. The pain is relieved by urination. Treatments tried: AZO. The treatment provided mild relief.        The following portions of the patient's history were reviewed and updated as appropriate: allergies, current medications, past family history, past medical history, past social history, past surgical history and problem list.    Allergies:  Allergies   Allergen Reactions   • Flexeril [Cyclobenzaprine] Rash   • Lexapro [Escitalopram Oxalate] Rash       Social History:  Social History     Socioeconomic History   • Marital status:      Spouse name: Not on file   • Number of children: Not on file   • Years of education: Not on file   • Highest education level: Not on file   Tobacco Use   • Smoking status: Former Smoker   • Smokeless tobacco: Never Used   Substance and Sexual Activity   • Alcohol use: No     Frequency: Never   • Drug use: No       Family History:  History reviewed. No pertinent family history.    Past Medical History :  Patient Active Problem List   Diagnosis   • Hearing loss   • Anxiety   • Peripheral vascular disease (CMS/[facility]CC)   • Osteoarthritis of multiple joints   • Asthma   • Controlled type 2 diabetes mellitus without complication, without long-term current use of insulin (CMS/[facility]CC)   • Hypertension   • Depressive disorder   • Atherosclerosis of coronary artery with angina pectoris (CMS/HCC)   • Mixed hyperlipidemia   • Gastroesophageal reflux disease   • Neuropathy        Medication List:  Outpatient Encounter Medications as of 12/8/2020   Medication Sig Dispense Refill   • ACCU-CHEK FASTCLIX LANCETS misc      • ACCU-CHEK SMARTVIEW test strip      • aspirin 325 MG tablet Take 325 mg by mouth Daily.     • clopidogrel (PLAVIX) 75 MG tablet Take 75 mg by mouth Daily.     • glipiZIDE (GLUCOTROL) 10 MG tablet Take 10 mg by mouth 2 (Two) Times a Day Before Meals.     • ibuprofen (ADVIL,MOTRIN) 800 MG tablet Take 1 tablet by mouth Every 8 (Eight) Hours As Needed for Mild Pain . 90 tablet 3   • Lancets Misc. (ACCU-CHEK FASTCLIX LANCET) kit      • lisinopril (PRINIVIL,ZESTRIL) 5 MG tablet Take 1 tablet by mouth Daily.     • lovastatin (MEVACOR) 20 MG tablet Take 20 mg by mouth Every Night.     • metFORMIN (GLUCOPHAGE) 500 MG tablet Take 500 mg by mouth 2 (Two) Times a Day With Meals.     • metoprolol tartrate (LOPRESSOR) 25 MG tablet metoprolol tartrate 25 mg tablet     • pantoprazole (PROTONIX) 40 MG EC tablet Take 40 mg by mouth Daily.     • sucralfate (CARAFATE) 1 g tablet Take 1 g by mouth 4 (Four) Times a Day.     • tadalafil (CIALIS) 10 MG tablet Take 10 mg by mouth Daily As Needed.     • tamsulosin (FLOMAX) 0.4 MG capsule 24 hr capsule tamsulosin 0.4 mg capsule   Take 1 capsule every day by oral route.     • traMADol (ULTRAM) 50 MG tablet Take 1 tablet by mouth Every 8 (Eight) Hours As Needed for Moderate Pain . 28 tablet 0   • vardenafil (Levitra) 20 MG tablet Levitra 20 mg tablet   Take 1 tablet every day by oral route.     • [DISCONTINUED] traMADol (ULTRAM) 50 MG tablet Take 50 mg by mouth Every 8 (Eight) Hours As Needed.  0   • amLODIPine (NORVASC) 2.5 MG tablet Take 2.5 mg by mouth Daily.     • indomethacin (INDOCIN) 25 MG capsule Take 1 capsule by mouth 3 (Three) Times a Day. 60 capsule 3   • montelukast (SINGULAIR) 10 MG tablet TAKE 1 TABLET EVERY DAY 90 tablet 0   • nitroglycerin (NITROSTAT) 0.4 MG SL tablet Place 0.4 mg under the tongue Every 5 (Five) Minutes As Needed.  "Take no more than 3 doses in 15 minutes.     • nystatin (MYCOSTATIN) 044677 UNIT/GM cream Apply  topically to the appropriate area as directed 2 (Two) Times a Day. 60 g 0   • sulfamethoxazole-trimethoprim (BACTRIM DS,SEPTRA DS) 800-160 MG per tablet Take 1 tablet by mouth 2 (Two) Times a Day. 20 tablet 0     No facility-administered encounter medications on file as of 12/8/2020.        Past Surgical History:  History reviewed. No pertinent surgical history.    Review of Systems:  Review of Systems   Constitutional: Negative for activity change, appetite change, fatigue and fever.   HENT: Negative for ear pain, swollen glands and voice change.    Eyes: Negative for visual disturbance.   Respiratory: Negative for shortness of breath and wheezing.    Cardiovascular: Negative for chest pain and leg swelling.   Gastrointestinal: Positive for abdominal pain. Negative for blood in stool, constipation, diarrhea, nausea and vomiting.   Endocrine: Negative for polydipsia and polyuria.   Genitourinary: Positive for frequency. Negative for dysuria and hematuria.   Musculoskeletal: Negative for joint swelling, neck pain and neck stiffness.   Skin: Negative for rash and bruise.   Neurological: Negative for weakness, numbness and headache.   Psychiatric/Behavioral: Negative for suicidal ideas and depressed mood.       I have reviewed and confirmed the accuracy of the HPI and ROS as documented by the MA/LPN/RN Clayton Chino MD    Vital Signs:  Visit Vitals  /66   Pulse 88   Temp 96.9 °F (36.1 °C)   Resp 18   Ht 170.2 cm (67\")   Wt 82.7 kg (182 lb 6.4 oz)   SpO2 99%   BMI 28.57 kg/m²       Physical Exam  Constitutional:       Appearance: He is well-developed.   HENT:      Head: Normocephalic and atraumatic.      Right Ear: External ear normal.      Left Ear: External ear normal.      Nose: Nose normal.   Eyes:      Pupils: Pupils are equal, round, and reactive to light.   Neck:      Musculoskeletal: Normal range of " motion and neck supple.   Cardiovascular:      Rate and Rhythm: Normal rate and regular rhythm.      Heart sounds: Normal heart sounds.   Pulmonary:      Effort: Pulmonary effort is normal.      Breath sounds: Normal breath sounds.   Abdominal:      General: Bowel sounds are normal.      Palpations: Abdomen is soft.   Musculoskeletal: Normal range of motion.   Skin:     General: Skin is warm and dry.   Neurological:      Mental Status: He is alert and oriented to person, place, and time.   Psychiatric:         Behavior: Behavior normal.         Thought Content: Thought content normal.         Judgment: Judgment normal.       Assessment and Plan:  Problem List Items Addressed This Visit     Osteoarthritis of multiple joints    Relevant Medications    traMADol (ULTRAM) 50 MG tablet      Other Visit Diagnoses     Urinary frequency    -  Primary    Relevant Medications    sulfamethoxazole-trimethoprim (BACTRIM DS,SEPTRA DS) 800-160 MG per tablet    Other Relevant Orders    POC Urinalysis Dipstick, Multipro (Completed)      Findings discussed. All questions answered.  Medication and medication adverse effects discussed.  Drug education given and explained to patient. Patient verbalized understanding.'    An After Visit Summary and PPPS were given to the patient.       I wore protective equipment throughout this patient encounter to include mask and eye protection. Hand hygiene was performed before donning protective equipment and after removal when leaving the room.

## 2020-12-31 ENCOUNTER — TELEPHONE (OUTPATIENT)
Dept: FAMILY MEDICINE CLINIC | Facility: CLINIC | Age: 70
End: 2020-12-31

## 2020-12-31 NOTE — TELEPHONE ENCOUNTER
Spoke with pt.  [facility] and they have him on a shot one a week now.  He said the lab was last month and that's when he started on the shot.

## 2020-12-31 NOTE — TELEPHONE ENCOUNTER
----- Message from Clayton Chino MD sent at 12/29/2020  9:54 AM EST -----  We received fax of A1c which was 11. Does pt have endocrinologist? If not, he needs referral. [facility] - unsure. If not, then he needs an appt here to adjust meds prior to endo eval if appt going to be months out.

## 2021-03-03 DIAGNOSIS — J45.909 ASTHMA, UNSPECIFIED ASTHMA SEVERITY, UNSPECIFIED WHETHER COMPLICATED, UNSPECIFIED WHETHER PERSISTENT: ICD-10-CM

## 2021-03-04 RX ORDER — MONTELUKAST SODIUM 10 MG/1
TABLET ORAL
Qty: 90 TABLET | Refills: 3 | Status: SHIPPED | OUTPATIENT
Start: 2021-03-04 | End: 2022-05-16

## 2021-03-11 DIAGNOSIS — M79.671 PAIN OF RIGHT HEEL: ICD-10-CM

## 2021-03-11 RX ORDER — IBUPROFEN 800 MG/1
TABLET ORAL
Qty: 90 TABLET | Refills: 0 | Status: SHIPPED | OUTPATIENT
Start: 2021-03-11

## 2022-01-10 DIAGNOSIS — J45.909 ASTHMA, UNSPECIFIED ASTHMA SEVERITY, UNSPECIFIED WHETHER COMPLICATED, UNSPECIFIED WHETHER PERSISTENT: ICD-10-CM

## 2022-01-10 RX ORDER — MONTELUKAST SODIUM 10 MG/1
TABLET ORAL
Qty: 90 TABLET | Refills: 3 | OUTPATIENT
Start: 2022-01-10

## 2022-01-20 ENCOUNTER — OFFICE VISIT (OUTPATIENT)
Dept: FAMILY MEDICINE CLINIC | Facility: CLINIC | Age: 72
End: 2022-01-20

## 2022-01-20 VITALS
WEIGHT: 186.5 LBS | HEART RATE: 87 BPM | BODY MASS INDEX: 29.27 KG/M2 | RESPIRATION RATE: 18 BRPM | SYSTOLIC BLOOD PRESSURE: 128 MMHG | OXYGEN SATURATION: 97 % | HEIGHT: 67 IN | TEMPERATURE: 97.6 F | DIASTOLIC BLOOD PRESSURE: 72 MMHG

## 2022-01-20 DIAGNOSIS — U07.1 COVID-19 VIRUS INFECTION: ICD-10-CM

## 2022-01-20 DIAGNOSIS — J06.9 UPPER RESPIRATORY TRACT INFECTION, UNSPECIFIED TYPE: Primary | ICD-10-CM

## 2022-01-20 DIAGNOSIS — E66.3 OVERWEIGHT (BMI 25.0-29.9): ICD-10-CM

## 2022-01-20 DIAGNOSIS — E11.40 TYPE 2 DIABETES MELLITUS WITH DIABETIC NEUROPATHY, WITHOUT LONG-TERM CURRENT USE OF INSULIN: ICD-10-CM

## 2022-01-20 DIAGNOSIS — I10 PRIMARY HYPERTENSION: ICD-10-CM

## 2022-01-20 PROCEDURE — 99213 OFFICE O/P EST LOW 20 MIN: CPT | Performed by: FAMILY MEDICINE

## 2022-01-20 RX ORDER — GUAIFENESIN AND CODEINE PHOSPHATE 100; 10 MG/5ML; MG/5ML
5 SOLUTION ORAL 3 TIMES DAILY PRN
Qty: 118 ML | Refills: 0 | Status: SHIPPED | OUTPATIENT
Start: 2022-01-20

## 2022-01-20 RX ORDER — CEPHALEXIN 500 MG/1
500 CAPSULE ORAL 3 TIMES DAILY
Qty: 21 CAPSULE | Refills: 0 | Status: SHIPPED | OUTPATIENT
Start: 2022-01-20 | End: 2022-01-27

## 2022-01-20 RX ORDER — SEMAGLUTIDE 1.34 MG/ML
INJECTION, SOLUTION SUBCUTANEOUS
COMMUNITY
Start: 2021-03-11

## 2022-01-20 NOTE — PROGRESS NOTES
Chief Complaint   Patient presents with   • URI       Subjective   Angelo Child is a 71 y.o. male.     URI   This is a new problem. Episode onset: 01/18/2022. The problem has been waxing and waning. Associated symptoms include congestion (clear), coughing (clear), diarrhea, headaches, sinus pain, sneezing and a sore throat. Pertinent negatives include no nausea or vomiting. Associated symptoms comments: Chills, body aches. He has tried NSAIDs (Robitussin) for the symptoms. The treatment provided moderate relief.      His wife has COVID but patient does not believe in COVID.  Symptom day #3.  He is unvaccinated against influenza, pneumonia and COVID.  He does not believe in vaccinations.  He has DM, last known a1c 11.  However, [facility] and [facility]em[facility].  Blood sugars improving.  Slight increase in fasting blood glucose since being sick (170s-180s).      Past Medical History :  Active Ambulatory Problems     Diagnosis Date Noted   • Hearing loss 07/12/2019   • Anxiety 07/12/2019   • Peripheral vascular disease (HCC) 07/12/2019   • Osteoarthritis of multiple joints 07/12/2019   • Asthma 07/12/2019   • Controlled type 2 diabetes mellitus without complication, without long-term current use of insulin ([facility]) 07/12/2019   • Hypertension 07/12/2019   • Depressive disorder 07/12/2019   • Atherosclerosis of coronary artery with angina pectoris ([facility]) 07/12/2019   • Mixed hyperlipidemia 07/12/2019   • Gastroesophageal reflux disease 07/12/2019   • Neuropathy 07/15/2019     Resolved Ambulatory Problems     Diagnosis Date Noted   • No Resolved Ambulatory Problems     No Additional Past Medical History       Medication List:    Current Outpatient Medications:   •  ACCU-CHEK FASTCLIX LANCETS misc, , Disp: , Rfl:   •  ACCU-CHEK SMARTVIEW test strip, , Disp: , Rfl:   •  amLODIPine (NORVASC) 2.5 MG tablet, Take 2.5 mg by mouth Daily., Disp: , Rfl:   •  aspirin 325 MG tablet, Take 325 mg by mouth  Daily., Disp: , Rfl:   •  clopidogrel (PLAVIX) 75 MG tablet, Take 75 mg by mouth Daily., Disp: , Rfl:   •  glipiZIDE (GLUCOTROL) 10 MG tablet, Take 10 mg by mouth 2 (Two) Times a Day Before Meals., Disp: , Rfl:   •  ibuprofen (ADVIL,MOTRIN) 800 MG tablet, TAKE 1 TABLET BY MOUTH EVERY 8 HOURS AS NEEDED FOR MILD PAIN, Disp: 90 tablet, Rfl: 0  •  indomethacin (INDOCIN) 25 MG capsule, Take 1 capsule by mouth 3 (Three) Times a Day., Disp: 60 capsule, Rfl: 3  •  Lancets Misc. (ACCU-CHEK FASTCLIX LANCET) kit, , Disp: , Rfl:   •  lisinopril (PRINIVIL,ZESTRIL) 5 MG tablet, Take 1 tablet by mouth Daily., Disp: , Rfl:   •  lovastatin (MEVACOR) 20 MG tablet, Take 20 mg by mouth Every Night., Disp: , Rfl:   •  metFORMIN (GLUCOPHAGE) 500 MG tablet, Take 500 mg by mouth 2 (Two) Times a Day With Meals., Disp: , Rfl:   •  metoprolol tartrate (LOPRESSOR) 25 MG tablet, metoprolol tartrate 25 mg tablet, Disp: , Rfl:   •  montelukast (SINGULAIR) 10 MG tablet, TAKE 1 TABLET EVERY DAY, Disp: 90 tablet, Rfl: 3  •  nitroglycerin (NITROSTAT) 0.4 MG SL tablet, Place 0.4 mg under the tongue Every 5 (Five) Minutes As Needed. Take no more than 3 doses in 15 minutes., Disp: , Rfl:   •  nystatin (MYCOSTATIN) 198905 UNIT/GM cream, Apply  topically to the appropriate area as directed 2 (Two) Times a Day., Disp: 60 g, Rfl: 0  •  pantoprazole (PROTONIX) 40 MG EC tablet, Take 40 mg by mouth Daily., Disp: , Rfl:   •  sucralfate (CARAFATE) 1 g tablet, Take 1 g by mouth 4 (Four) Times a Day., Disp: , Rfl:   •  sulfamethoxazole-trimethoprim (BACTRIM DS,SEPTRA DS) 800-160 MG per tablet, Take 1 tablet by mouth 2 (Two) Times a Day., Disp: 20 tablet, Rfl: 0  •  tadalafil (CIALIS) 10 MG tablet, Take 10 mg by mouth Daily As Needed., Disp: , Rfl:   •  tamsulosin (FLOMAX) 0.4 MG capsule 24 hr capsule, tamsulosin 0.4 mg capsule  Take 1 capsule every day by oral route., Disp: , Rfl:   •  traMADol (ULTRAM) 50 MG tablet, Take 1 tablet by mouth Every 8 (Eight) Hours As  "Needed for Moderate Pain ., Disp: 28 tablet, Rfl: 0  •  vardenafil (Levitra) 20 MG tablet, Levitra 20 mg tablet  Take 1 tablet every day by oral route., Disp: , Rfl:     Allergies   Allergen Reactions   • Flexeril [Cyclobenzaprine] Rash   • Lexapro [Escitalopram Oxalate] Rash       Social History     Tobacco Use   • Smoking status: Former Smoker   • Smokeless tobacco: Never Used   Substance Use Topics   • Alcohol use: No       There is no immunization history on file for this patient.      Review of Systems   Constitutional: Positive for chills. Negative for fever (he has felt feverish).   HENT: Positive for congestion (clear), sneezing and sore throat.    Respiratory: Positive for cough (clear).    Gastrointestinal: Positive for diarrhea. Negative for nausea and vomiting.         Objective   Vitals:    01/20/22 1522   BP: 128/72   BP Location: Right arm   Patient Position: Sitting   Cuff Size: Adult   Pulse: 87   Resp: 18   Temp: 97.6 °F (36.4 °C)   TempSrc: Temporal   SpO2: 97%   Weight: 84.6 kg (186 lb 8 oz)   Height: 170.2 cm (67\")     Body mass index is 29.21 kg/m².    Physical Exam  Constitutional:       General: He is not in acute distress.     Appearance: Normal appearance. He is well-developed. He is not ill-appearing, toxic-appearing or diaphoretic.   HENT:      Head: Normocephalic and atraumatic.      Right Ear: Tympanic membrane, ear canal and external ear normal.      Left Ear: Tympanic membrane, ear canal and external ear normal.      Nose: Congestion present. No rhinorrhea.      Right Sinus: Maxillary sinus tenderness present. No frontal sinus tenderness.      Left Sinus: Maxillary sinus tenderness present. No frontal sinus tenderness.      Mouth/Throat:      Mouth: Mucous membranes are moist.      Pharynx: No oropharyngeal exudate or posterior oropharyngeal erythema.   Eyes:      General: No scleral icterus.        Right eye: No discharge.         Left eye: No discharge.      Extraocular Movements: " Extraocular movements intact.      Conjunctiva/sclera: Conjunctivae normal.   Cardiovascular:      Rate and Rhythm: Normal rate and regular rhythm.      Heart sounds: Normal heart sounds. No murmur heard.      Pulmonary:      Effort: Pulmonary effort is normal.      Breath sounds: Normal breath sounds.   Musculoskeletal:      Cervical back: Normal range of motion and neck supple.      Right lower leg: No edema.      Left lower leg: No edema.   Lymphadenopathy:      Cervical: No cervical adenopathy.   Skin:     General: Skin is warm.      Capillary Refill: Capillary refill takes less than 2 seconds.      Findings: No rash.   Neurological:      General: No focal deficit present.      Mental Status: He is alert.         Assessment/Plan     Diagnoses and all orders for this visit:    1. Upper respiratory tract infection, unspecified type (Primary)  -     Cancel: POCT Influenza A/B  -     Cancel: COVID-19,[facility] ROUTINE, NP/OP SWAB IN TRANSPORT MEDIA OR ESWAB 72 HR TAT - Swab, Nasopharynx  -     COVID-19,[facility] ROUTINE, NP/OP SWAB IN TRANSPORT MEDIA OR ESWAB 72 HR TAT - Swab, Anterior nasal  -     cephalexin (KEFLEX) 500 MG capsule; Take 1 capsule by mouth 3 (Three) Times a Day for 7 days.  Dispense: 21 capsule; Refill: 0  -     guaiFENesin-codeine (GUAIFENESIN AC) 100-10 MG/5ML liquid; Take 5 mL by mouth 3 (Three) Times a Day As Needed for Cough.  Dispense: 118 mL; Refill: 0  -     COVID-19,[facility] ROUTINE, NP/OP SWAB IN TRANSPORT MEDIA OR ESWAB 72 HR TAT - ,  -     SARS-CoV-2, TUCKER 2 DAY TAT - ,    2. COVID-19 virus infection  Comments:  PCR testing returns positive for COVID-19 infection.    3. Type 2 diabetes mellitus with diabetic neuropathy, without long-term current use of insulin ([facility])  Comments:  [facility].  He reports blood sugars are improving with use of Ozempic.  He was warned about possible elevation secondary to acute infection.    4. Primary hypertension  Comments:  Well-controlled today.    5.  Overweight (BMI 25.0-29.9)    Known COVID exposure. Await testing.  Patient declined flu testing, stating he did not want the test if the swab had to be put up his nose.    RX as above.  He requests cough syrup and ABX.  We did not discuss monoclonal antibody infusion or oral antiviral medications today.    Risk factors for complicated COVID-19 infection include age greater than 65, overweight status, history of chronic lung disease (asthma), hypertension, heart disease and diabetes mellitus.  Patient is unvaccinated.    Hypertension and asthma are stable today.  Diabetes mellitus improving per patient report.  [facility]    Return if symptoms worsen or fail to improve.       Patient was given instructions and counseling regarding his/her condition or for health maintenance advice. Please see specific information pulled into the AVS if appropriate.     I wore protective equipment throughout this patient encounter to include N95 mask, gown and eye protection. Hand hygiene was performed before donning protective equipment and after removal when leaving the room.

## 2022-01-22 PROBLEM — E66.3 OVERWEIGHT (BMI 25.0-29.9): Status: ACTIVE | Noted: 2022-01-22

## 2022-01-22 PROBLEM — U07.1 COVID-19 VIRUS INFECTION: Status: ACTIVE | Noted: 2022-01-22

## 2022-01-22 LAB
LABCORP SARS-COV-2, NAA 2 DAY TAT: NORMAL
SARS-COV-2 RNA RESP QL NAA+PROBE: DETECTED

## 2022-05-13 DIAGNOSIS — J45.909 ASTHMA, UNSPECIFIED ASTHMA SEVERITY, UNSPECIFIED WHETHER COMPLICATED, UNSPECIFIED WHETHER PERSISTENT: ICD-10-CM

## 2022-05-16 RX ORDER — MONTELUKAST SODIUM 10 MG/1
TABLET ORAL
Qty: 90 TABLET | Refills: 3 | Status: SHIPPED | OUTPATIENT
Start: 2022-05-16

## 2025-06-02 NOTE — PROGRESS NOTES
"Chief Complaint  Establish Care and Leg Pain (Right leg )    Subjective          Angelo Child presents to [facility] for     HPI  Patient presents to clinic to establish care. [facility] and he does not intend to transfer primary care to our office. He says he would like to come here only for acute concerns and does not want us assuming management of his chronic medical issues or medications.    Right lower back, hip, and upper posterior/lateral leg pain.  His back pain is chronic for many years.  He states he was told in the past that he has disc issues in his lower back.  Over the past week or so, his back pain has radiated through his right hip to his upper right leg.  Chronic lower back pain, right worse than left.  Radiates through right hip and upper right posterior/lateral leg.  ZEE in 2022 showed mild arterial disease in RLE, no arterial disease in LLE.  Pain actually resolves once he has walked a bit, worse at rest typically.      Objective   Vital Signs:   /82 (BP Location: Right arm, Patient Position: Sitting, Cuff Size: Adult)   Pulse 88   Temp 97.5 °F (36.4 °C) (Temporal)   Resp 20   Ht 170.2 cm (67.01\")   Wt 79.7 kg (175 lb 9.6 oz)   SpO2 95%   BMI 27.50 kg/m²     Physical Exam  Vitals and nursing note reviewed.   Constitutional:       General: He is not in acute distress.     Appearance: Normal appearance. He is not ill-appearing or diaphoretic.   HENT:      Head: Normocephalic and atraumatic.      Nose: No congestion or rhinorrhea.   Eyes:      Extraocular Movements: Extraocular movements intact.      Pupils: Pupils are equal, round, and reactive to light.   Cardiovascular:      Rate and Rhythm: Normal rate and regular rhythm.      Pulses: Normal pulses.   Pulmonary:      Effort: Pulmonary effort is normal.      Breath sounds: Normal breath sounds.   Musculoskeletal:         General: Normal range of motion.      Cervical " back: Normal range of motion.      Lumbar back: No tenderness or bony tenderness. Negative right straight leg raise test and negative left straight leg raise test.   Skin:     General: Skin is warm and dry.   Neurological:      Mental Status: He is alert and oriented to person, place, and time.      Sensory: No sensory deficit.      Motor: No weakness.   Psychiatric:         Mood and Affect: Mood normal.         Behavior: Behavior normal.        Result Review :                 Assessment and Plan    Diagnoses and all orders for this visit:    1. Chronic right-sided low back pain with right-sided sciatica (Primary)  Assessment & Plan:  Acute on chronic.  We will get lumbar radiographs given chronic lower back pain issues.  He declines Rx and PT.  Is agreeable to pain management.  Avoid NSAIDs.    Orders:  -     Ambulatory Referral to Pain Management  -     XR Spine Lumbar 2 or 3 View    2. Atherosclerosis of coronary artery with angina pectoris, unspecified vessel or lesion type, unspecified whether native or transplanted heart  Assessment & Plan:  MI in 2006, s/p stents and CABG (4 vessel), per patient report.  He is not following with cardiology.  He is not taking several of his medications including his ACEi and beta blocker.  He is not agreeable to this provider assuming primary care as he intends to [facility].  Strongly encouraged him to reestablish with cardiology, he is agreeable.  We will get him set up with Dr. Ding.    Orders:  -     Ambulatory Referral to Cardiology for Chest Pain    3. Has difficulty accessing primary care provider for most visits  Assessment & Plan:  Patient states he desires to [facility] seeing [facility].  He declines transfer of any primary care services to our office.  He would not like for us to assume management of any of his chronic medications.  [facility] into [facility] for [facility] concerns and would like to see us exclusively in  that capacity.             Rios Rosales MD    NOTE: [employer], per Health Information accessibility laws, allows progress notes to be visible to patients. Please note that these notes will include professional medical terminology that may be somewhat confusing without some interpretation from your medical professional team. The intent of progress notes is to communicate information between any medical professionals involved in your case, or to serve as future reference for myself or any other provider when reviewing your case, as well as a reference for the patient viewing the record. Please ask a member of the medical team if you have any questions about terminology or content of note.    DISCLAIMER: Part of this note may be an electronic transcription/translation of spoken language to printed text using the Dragon Dictation System.

## 2025-06-04 ENCOUNTER — OFFICE VISIT (OUTPATIENT)
Dept: FAMILY MEDICINE CLINIC | Facility: CLINIC | Age: 75
End: 2025-06-04
Payer: MEDICARE

## 2025-06-04 ENCOUNTER — HOSPITAL ENCOUNTER (OUTPATIENT)
Dept: GENERAL RADIOLOGY | Facility: HOSPITAL | Age: 75
Discharge: HOME | End: 2025-06-04
Payer: MEDICARE

## 2025-06-04 VITALS
WEIGHT: 175.6 LBS | TEMPERATURE: 97.5 F | HEIGHT: 67 IN | HEART RATE: 88 BPM | RESPIRATION RATE: 20 BRPM | OXYGEN SATURATION: 95 % | SYSTOLIC BLOOD PRESSURE: 136 MMHG | DIASTOLIC BLOOD PRESSURE: 82 MMHG | BODY MASS INDEX: 27.56 KG/M2

## 2025-06-04 DIAGNOSIS — I25.119 ATHEROSCLEROSIS OF CORONARY ARTERY WITH ANGINA PECTORIS, UNSPECIFIED VESSEL OR LESION TYPE, UNSPECIFIED WHETHER NATIVE OR TRANSPLANTED HEART: ICD-10-CM

## 2025-06-04 DIAGNOSIS — M54.41 CHRONIC RIGHT-SIDED LOW BACK PAIN WITH RIGHT-SIDED SCIATICA: Primary | ICD-10-CM

## 2025-06-04 DIAGNOSIS — Z91.89 HAS DIFFICULTY ACCESSING PRIMARY CARE PROVIDER FOR MOST VISITS: ICD-10-CM

## 2025-06-04 DIAGNOSIS — G89.29 CHRONIC RIGHT-SIDED LOW BACK PAIN WITH RIGHT-SIDED SCIATICA: Primary | ICD-10-CM

## 2025-06-04 PROCEDURE — 72100 X-RAY EXAM L-S SPINE 2/3 VWS: CPT

## 2025-06-04 RX ORDER — AMLODIPINE BESYLATE 10 MG/1
10 TABLET ORAL DAILY
COMMUNITY

## 2025-06-04 RX ORDER — GABAPENTIN 100 MG/1
100 CAPSULE ORAL 3 TIMES DAILY
COMMUNITY

## 2025-06-04 RX ORDER — TAMSULOSIN HYDROCHLORIDE 0.4 MG/1
1 CAPSULE ORAL DAILY
COMMUNITY

## 2025-06-04 RX ORDER — CETIRIZINE HYDROCHLORIDE 10 MG/1
10 TABLET ORAL DAILY
COMMUNITY

## 2025-06-04 NOTE — ASSESSMENT & PLAN NOTE
MI in 2006, s/p stents and CABG (4 vessel), per patient report.  He is not following with cardiology.  He is not taking several of his medications including his ACEi and beta blocker.  He is not agreeable to this provider assuming primary care as he intends to continue following [facility].  Strongly encouraged him to reestablish with cardiology, he is agreeable.  We will get him set up with Dr. Ding.

## 2025-06-05 PROBLEM — Z91.89 HAS DIFFICULTY ACCESSING PRIMARY CARE PROVIDER FOR MOST VISITS: Status: ACTIVE | Noted: 2025-06-05

## 2025-06-05 NOTE — ASSESSMENT & PLAN NOTE
Patient states he desires to continue seeing [facility].  He declines transfer of any primary care services to our office.  He would not like for us to assume management of any of his chronic medications.  [facility] getting [facility] for [facility] concerns and would like to see us exclusively in that capacity.

## 2025-06-09 DIAGNOSIS — M54.41 CHRONIC RIGHT-SIDED LOW BACK PAIN WITH RIGHT-SIDED SCIATICA: Primary | ICD-10-CM

## 2025-06-09 DIAGNOSIS — G89.29 CHRONIC RIGHT-SIDED LOW BACK PAIN WITH RIGHT-SIDED SCIATICA: Primary | ICD-10-CM

## 2025-06-20 PROBLEM — G89.29 CHRONIC RIGHT-SIDED LOW BACK PAIN WITH RIGHT-SIDED SCIATICA: Status: ACTIVE | Noted: 2025-06-20

## 2025-06-20 PROBLEM — M54.41 CHRONIC RIGHT-SIDED LOW BACK PAIN WITH RIGHT-SIDED SCIATICA: Status: ACTIVE | Noted: 2025-06-20

## 2025-06-20 NOTE — ASSESSMENT & PLAN NOTE
Acute on chronic.  We will get lumbar radiographs given chronic lower back pain issues.  He declines Rx and PT.  Is agreeable to pain management.  Avoid NSAIDs.

## 2025-08-08 ENCOUNTER — OFFICE VISIT (OUTPATIENT)
Dept: CARDIOLOGY | Facility: CLINIC | Age: 75
End: 2025-08-08
Payer: MEDICARE

## 2025-08-08 VITALS
OXYGEN SATURATION: 97 % | WEIGHT: 175 LBS | BODY MASS INDEX: 27.47 KG/M2 | HEART RATE: 81 BPM | HEIGHT: 67 IN | SYSTOLIC BLOOD PRESSURE: 141 MMHG | DIASTOLIC BLOOD PRESSURE: 78 MMHG | RESPIRATION RATE: 16 BRPM

## 2025-08-08 DIAGNOSIS — I10 PRIMARY HYPERTENSION: Primary | ICD-10-CM

## 2025-08-08 DIAGNOSIS — I25.119 ATHEROSCLEROSIS OF CORONARY ARTERY WITH ANGINA PECTORIS, UNSPECIFIED VESSEL OR LESION TYPE, UNSPECIFIED WHETHER NATIVE OR TRANSPLANTED HEART: ICD-10-CM

## 2025-08-08 DIAGNOSIS — E11.9 CONTROLLED TYPE 2 DIABETES MELLITUS WITHOUT COMPLICATION, WITHOUT LONG-TERM CURRENT USE OF INSULIN: ICD-10-CM

## 2025-08-08 DIAGNOSIS — R06.02 CHRONIC SHORTNESS OF BREATH: ICD-10-CM

## 2025-08-08 DIAGNOSIS — E78.2 MIXED HYPERLIPIDEMIA: ICD-10-CM

## 2025-08-08 RX ORDER — AMLODIPINE BESYLATE 10 MG/1
10 TABLET ORAL DAILY
COMMUNITY

## 2025-08-11 ENCOUNTER — PATIENT ROUNDING (BHMG ONLY) (OUTPATIENT)
Dept: CARDIOLOGY | Facility: CLINIC | Age: 75
End: 2025-08-11
Payer: MEDICARE